# Patient Record
Sex: MALE | Race: WHITE | NOT HISPANIC OR LATINO | Employment: FULL TIME | URBAN - METROPOLITAN AREA
[De-identification: names, ages, dates, MRNs, and addresses within clinical notes are randomized per-mention and may not be internally consistent; named-entity substitution may affect disease eponyms.]

---

## 2022-03-17 ENCOUNTER — TELEPHONE (OUTPATIENT)
Dept: SCHEDULING | Facility: IMAGING CENTER | Age: 22
End: 2022-03-17

## 2022-03-21 ENCOUNTER — OFFICE VISIT (OUTPATIENT)
Dept: MEDICAL GROUP | Facility: LAB | Age: 22
End: 2022-03-21
Payer: COMMERCIAL

## 2022-03-21 VITALS
WEIGHT: 264 LBS | SYSTOLIC BLOOD PRESSURE: 118 MMHG | DIASTOLIC BLOOD PRESSURE: 76 MMHG | HEIGHT: 70 IN | TEMPERATURE: 97.5 F | RESPIRATION RATE: 16 BRPM | HEART RATE: 68 BPM | OXYGEN SATURATION: 97 % | BODY MASS INDEX: 37.8 KG/M2

## 2022-03-21 DIAGNOSIS — Z13.29 THYROID DISORDER SCREENING: ICD-10-CM

## 2022-03-21 DIAGNOSIS — Z76.89 ENCOUNTER TO ESTABLISH CARE: ICD-10-CM

## 2022-03-21 DIAGNOSIS — G89.29 CHRONIC NECK PAIN: ICD-10-CM

## 2022-03-21 DIAGNOSIS — Z13.220 LIPID SCREENING: ICD-10-CM

## 2022-03-21 DIAGNOSIS — F41.9 ANXIETY: ICD-10-CM

## 2022-03-21 DIAGNOSIS — M54.2 CHRONIC NECK PAIN: ICD-10-CM

## 2022-03-21 PROCEDURE — 99385 PREV VISIT NEW AGE 18-39: CPT | Performed by: PHYSICIAN ASSISTANT

## 2022-03-21 RX ORDER — GABAPENTIN 600 MG/1
TABLET ORAL
COMMUNITY
Start: 2022-01-10 | End: 2022-06-21

## 2022-03-21 NOTE — LETTER
[employer] [facility] [place]  Briseida Ronquillo P.A.-C.  [address] [address]  [place] [address]  Fax: [phone]   Authorization for Release/Disclosure of   Protected Health Information   Name: SANDI HAYNES : 2000 SSN: [id number]   Address: [address] [place] [address]  [place] [address] Phone:    [phone] (home)    I authorize the entity listed below to release/disclose the PHI below to:   [facility]/Briseida Ronquillo P.A.-C. and Briseida Ronquillo P.A.-C.   Provider or Entity Name:     Address   City, State, Zip   Phone:      Fax:     Reason for request: continuity of care   Information to be released:    [  ] LAST COLONOSCOPY,  including any PATH REPORT and follow-up  [  ] LAST FIT/COLOGUARD RESULT [  ] LAST DEXA  [  ] LAST MAMMOGRAM  [  ] LAST PAP  [  ] LAST LABS [  ] RETINA EXAM REPORT  [  ] IMMUNIZATION RECORDS  [  ] Release all info      [  ] Check here and initial the line next to each item to release ALL health information INCLUDING  _____ Care and treatment for drug and / or alcohol abuse  _____ HIV testing, infection status, or AIDS  _____ Genetic Testing    DATES OF SERVICE OR TIME PERIOD TO BE DISCLOSED: _____________  I understand and acknowledge that:  * This Authorization may be revoked at any time by you in writing, except if your health information has already been used or disclosed.  * Your health information that will be used or disclosed as a result of you signing this authorization could be re-disclosed by the recipient. If this occurs, your re-disclosed health information may no longer be protected by State or Federal laws.  * You may refuse to sign this Authorization. Your refusal will not affect your ability to obtain treatment.  * This Authorization becomes effective upon signing and will  on (date) __________.      If no date is indicated, this Authorization will  one (1) year from the signature date.    Name: Sandi Haynes    Signature:   Date:     3/21/2022       PLEASE  FAX REQUESTED RECORDS BACK TO: [phone]

## 2022-03-21 NOTE — PROGRESS NOTES
Subjective:     CC:  Diagnoses of Encounter to establish care, Lipid screening, Thyroid disorder screening, Chronic neck pain, Anxiety, and BMI 37.0-37.9, adult were pertinent to this visit.    HISTORY OF THE PRESENT ILLNESS: Patient is a 21 y.o. male. This pleasant patient is here today to establish care and discuss. His/her prior PCP was Dr Pool.    Hx of chronic pain  Patient reports a chronic history of neck pain for the past 3 years.  He has been evaluated by pain management, neuro, neuro surgery and had several imaging tests including MRI done.  Questional diagnosis of osteoarthritis.  At this time patient symptoms are controlled with gabapentin 600 mg 4 times daily.  Patient is elected to defer injections with pain management at this time, he is following with physical therapy.  Patient does not need medication refills today.    Anxiety  Patient was previously described Zoloft 50 mg 1 tablet daily for anxiety symptoms.  He does not take this medication regularly although he does note when he does take it that his symptoms are fairly well controlled.  Denies medication side effects.  Does not require medication refills today        Health Maintenance     - Dyslipidemia (30-45): Ordered  - Diabetes (HTN, HLD, BMI >25): Ordered  - Dental: none  - Eye: UTD  Diet: try to be conscious, likes fatty foods  Exercise: physical job  Substance Use:  Denies  Tobacco Use/counseling: Denies  Safe in relationship: Yes       Infectious disease screening/Immunizations    --Immunizations:               Influenza:  We will request records   Covid-19: We will request records              Tetanus: We will request records              HPV: We will request records    Current Outpatient Medications Ordered in Epic   Medication Sig Dispense Refill   • gabapentin (NEURONTIN) 600 MG tablet TAKE 1 TABLET BY MOUTH UP TO 4 TIMES DAILY     • sertraline (ZOLOFT) 50 MG Tab Take 50 mg by mouth every day.       No current Epic-ordered  "facility-administered medications on file.       ROS:   Gen: no fevers/chills, no changes in weight  Eyes: no changes in vision  ENT: no sore throat, no hearing loss, no bloody nose  Pulm: no sob, no cough  CV: no chest pain, no palpitations  GI: no nausea/vomiting, no diarrhea  : no dysuria  MSk: no myalgias  Skin: no rash  Neuro: no headaches, no numbness/tingling  Heme/Lymph: no easy bruising      Objective:       Exam: /76   Pulse 68   Temp 36.4 °C (97.5 °F)   Resp 16   Ht 1.778 m (5' 10\")   Wt 120 kg (264 lb)   SpO2 97%  Body mass index is 37.88 kg/m².    General: Normal appearing. No distress.  HEENT: Normocephalic. Eyes conjunctiva clear lids without ptosis, pupils equal and reactive to light accommodation, ears normal shape and contour, canals are clear bilaterally, tympanic membranes are benign, nasal mucosa benign, oropharynx is without erythema, edema or exudates.   Neck: Supple without JVD or bruit. Thyroid is not enlarged.  Pulmonary: Clear to ausculation.  Normal effort. No rales, ronchi, or wheezing.  Cardiovascular: Regular rate and rhythm without murmur. Carotid and radial pulses are intact and equal bilaterally.  Abdomen: Soft, nontender, nondistended. Normal bowel sounds. Liver and spleen are not palpable  Neurologic: Grossly nonfocal  Lymph: No cervical or supraclavicular lymph nodes are palpable  Skin: Warm and dry.  No obvious lesions.  Musculoskeletal: Normal gait. No extremity cyanosis, clubbing, or edema.  Psych: Normal mood and affect. Alert and oriented x3. Judgment and insight is normal.      Assessment & Plan:   21 y.o. male with the following -    1. Encounter to establish care  2. Lipid screening  3. Thyroid disorder screening  Labs per orders  We will request records from previous provider for vaccines  Patient is never due for any preventive screening procedures at this time    4. Chronic neck pain  Chronic condition, stable, new to me  Continue follow-up with " specialists, including pain management and physical therapy  Continue gabapentin 600 mg 4 times daily as needed    5. Anxiety  Chronic condition, stable  Continue Zoloft 50 mg 1 tablet daily    6. BMI 37.0-37.9, adult  Patient's weight was discussed today, including healthy low-carb diet, 30-minutes of moderate exercise daily and avoiding sugars and high-fat foods.          I spent a total of 15 minutes with record review, exam, communication with the patient, communication with other providers, and documentation of this encounter.    Return in about 4 weeks (around 4/18/2022).    Please note that this dictation was created using voice recognition software. I have made every reasonable attempt to correct obvious errors, but I expect that there are errors of grammar and possibly content that I did not discover before finalizing the note.

## 2022-04-18 ENCOUNTER — HOSPITAL ENCOUNTER (OUTPATIENT)
Dept: LAB | Facility: MEDICAL CENTER | Age: 22
End: 2022-04-18
Attending: PHYSICIAN ASSISTANT
Payer: COMMERCIAL

## 2022-04-18 DIAGNOSIS — Z13.220 LIPID SCREENING: ICD-10-CM

## 2022-04-18 DIAGNOSIS — Z13.29 THYROID DISORDER SCREENING: ICD-10-CM

## 2022-04-18 LAB
ALBUMIN SERPL BCP-MCNC: 4.8 G/DL (ref 3.2–4.9)
ALBUMIN/GLOB SERPL: 1.8 G/DL
ALP SERPL-CCNC: 69 U/L (ref 30–99)
ALT SERPL-CCNC: 32 U/L (ref 2–50)
ANION GAP SERPL CALC-SCNC: 13 MMOL/L (ref 7–16)
AST SERPL-CCNC: 21 U/L (ref 12–45)
BASOPHILS # BLD AUTO: 0.8 % (ref 0–1.8)
BASOPHILS # BLD: 0.05 K/UL (ref 0–0.12)
BILIRUB SERPL-MCNC: 0.4 MG/DL (ref 0.1–1.5)
BUN SERPL-MCNC: 15 MG/DL (ref 8–22)
CALCIUM SERPL-MCNC: 9.1 MG/DL (ref 8.5–10.5)
CHLORIDE SERPL-SCNC: 105 MMOL/L (ref 96–112)
CHOLEST SERPL-MCNC: 146 MG/DL (ref 100–199)
CO2 SERPL-SCNC: 21 MMOL/L (ref 20–33)
CREAT SERPL-MCNC: 0.69 MG/DL (ref 0.5–1.4)
EOSINOPHIL # BLD AUTO: 0.14 K/UL (ref 0–0.51)
EOSINOPHIL NFR BLD: 2.1 % (ref 0–6.9)
ERYTHROCYTE [DISTWIDTH] IN BLOOD BY AUTOMATED COUNT: 39.9 FL (ref 35.9–50)
EST. AVERAGE GLUCOSE BLD GHB EST-MCNC: 105 MG/DL
FASTING STATUS PATIENT QL REPORTED: NORMAL
GFR SERPLBLD CREATININE-BSD FMLA CKD-EPI: 134 ML/MIN/1.73 M 2
GLOBULIN SER CALC-MCNC: 2.7 G/DL (ref 1.9–3.5)
GLUCOSE SERPL-MCNC: 92 MG/DL (ref 65–99)
HBA1C MFR BLD: 5.3 % (ref 4–5.6)
HCT VFR BLD AUTO: 46.5 % (ref 42–52)
HDLC SERPL-MCNC: 50 MG/DL
HGB BLD-MCNC: 14.9 G/DL (ref 14–18)
IMM GRANULOCYTES # BLD AUTO: 0.04 K/UL (ref 0–0.11)
IMM GRANULOCYTES NFR BLD AUTO: 0.6 % (ref 0–0.9)
LDLC SERPL CALC-MCNC: 87 MG/DL
LYMPHOCYTES # BLD AUTO: 1.83 K/UL (ref 1–4.8)
LYMPHOCYTES NFR BLD: 27.8 % (ref 22–41)
MCH RBC QN AUTO: 26.1 PG (ref 27–33)
MCHC RBC AUTO-ENTMCNC: 32 G/DL (ref 33.7–35.3)
MCV RBC AUTO: 81.4 FL (ref 81.4–97.8)
MONOCYTES # BLD AUTO: 0.52 K/UL (ref 0–0.85)
MONOCYTES NFR BLD AUTO: 7.9 % (ref 0–13.4)
NEUTROPHILS # BLD AUTO: 4 K/UL (ref 1.82–7.42)
NEUTROPHILS NFR BLD: 60.8 % (ref 44–72)
NRBC # BLD AUTO: 0 K/UL
NRBC BLD-RTO: 0 /100 WBC
PLATELET # BLD AUTO: 324 K/UL (ref 164–446)
PMV BLD AUTO: 9.7 FL (ref 9–12.9)
POTASSIUM SERPL-SCNC: 4.3 MMOL/L (ref 3.6–5.5)
PROT SERPL-MCNC: 7.5 G/DL (ref 6–8.2)
RBC # BLD AUTO: 5.71 M/UL (ref 4.7–6.1)
SODIUM SERPL-SCNC: 139 MMOL/L (ref 135–145)
TRIGL SERPL-MCNC: 47 MG/DL (ref 0–149)
TSH SERPL DL<=0.005 MIU/L-ACNC: 1.44 UIU/ML (ref 0.38–5.33)
WBC # BLD AUTO: 6.6 K/UL (ref 4.8–10.8)

## 2022-04-18 PROCEDURE — 84443 ASSAY THYROID STIM HORMONE: CPT

## 2022-04-18 PROCEDURE — 83036 HEMOGLOBIN GLYCOSYLATED A1C: CPT

## 2022-04-18 PROCEDURE — 80061 LIPID PANEL: CPT

## 2022-04-18 PROCEDURE — 36415 COLL VENOUS BLD VENIPUNCTURE: CPT

## 2022-04-18 PROCEDURE — 85025 COMPLETE CBC W/AUTO DIFF WBC: CPT

## 2022-04-18 PROCEDURE — 80053 COMPREHEN METABOLIC PANEL: CPT

## 2022-06-21 ENCOUNTER — OFFICE VISIT (OUTPATIENT)
Dept: MEDICAL GROUP | Facility: LAB | Age: 22
End: 2022-06-21
Payer: COMMERCIAL

## 2022-06-21 VITALS
HEART RATE: 60 BPM | HEIGHT: 70 IN | BODY MASS INDEX: 35.65 KG/M2 | DIASTOLIC BLOOD PRESSURE: 78 MMHG | WEIGHT: 249 LBS | TEMPERATURE: 97.8 F | SYSTOLIC BLOOD PRESSURE: 120 MMHG | RESPIRATION RATE: 16 BRPM | OXYGEN SATURATION: 97 %

## 2022-06-21 PROCEDURE — 99212 OFFICE O/P EST SF 10 MIN: CPT | Performed by: PHYSICIAN ASSISTANT

## 2022-06-21 ASSESSMENT — FIBROSIS 4 INDEX: FIB4 SCORE: 0.25

## 2022-06-21 NOTE — PROGRESS NOTES
"Subjective:     CC: f/u labs    HPI:   Artie here today with    Pt reports that his dad was dx with HTN, Pt's BP has been well controlled.    Trying to lose weight to join [employer], needs to lose additional 30 lbs, will plan to join in Nov. Has lost 15lbs since last appt.    Stopped taking gabapentin and zoloft as [employer] does not allow prescription medications. Pt states that his symptoms continue to be well controlled without medications.     ROS:  Gen: no fevers/chills, no changes in weight  Eyes: no changes in vision  ENT: no sore throat, no hearing loss, no bloody nose  Pulm: no sob, no cough  CV: no chest pain, no palpitations  GI: no nausea/vomiting, no diarrhea  : no dysuria  MSk: no myalgias  Skin: no rash  Neuro: no headaches, no numbness/tingling  Heme/Lymph: no easy bruising    No current Epic-ordered outpatient medications on file.     No current Epic-ordered facility-administered medications on file.         Objective:     Exam:  /78   Pulse 60   Temp 36.6 °C (97.8 °F)   Resp 16   Ht 1.778 m (5' 10\")   Wt 113 kg (249 lb)   SpO2 97%   BMI 35.73 kg/m²  Body mass index is 35.73 kg/m².    Gen: Alert and oriented, No apparent distress.  Neck: Neck is supple without lymphadenopathy.  Lungs: Normal effort, CTA bilaterally, no wheezes, rhonchi, or rales  CV: Regular rate and rhythm. No murmurs, rubs, or gallops.  Ext: No clubbing, cyanosis, edema.      Assessment & Plan:     21 y.o. male with the following -     1. BMI 35.0-35.9,adult  Patient's weight was discussed today, including healthy low-carb diet, 30-minutes of moderate exercise daily and avoiding sugars and high-fat foods.          I spent a total of 12 minutes with record review, exam, communication with the patient, communication with other providers, and documentation of this encounter.      Return in about 1 year (around 6/21/2023).    Please note that this dictation was created using voice recognition software. I have made every " reasonable attempt to correct obvious errors, but there may be errors of grammar and possibly content that I did not discover before finalizing the note.

## 2024-11-15 ENCOUNTER — OFFICE VISIT (OUTPATIENT)
Dept: MEDICAL GROUP | Facility: LAB | Age: 24
End: 2024-11-15
Payer: COMMERCIAL

## 2024-11-15 VITALS
TEMPERATURE: 96.7 F | HEART RATE: 72 BPM | OXYGEN SATURATION: 96 % | WEIGHT: 275.4 LBS | RESPIRATION RATE: 14 BRPM | HEIGHT: 71 IN | DIASTOLIC BLOOD PRESSURE: 72 MMHG | SYSTOLIC BLOOD PRESSURE: 116 MMHG | BODY MASS INDEX: 38.56 KG/M2

## 2024-11-15 DIAGNOSIS — Z13.29 SCREENING FOR THYROID DISORDER: ICD-10-CM

## 2024-11-15 DIAGNOSIS — F41.9 ANXIETY: ICD-10-CM

## 2024-11-15 DIAGNOSIS — Z13.220 LIPID SCREENING: ICD-10-CM

## 2024-11-15 DIAGNOSIS — Z76.89 ENCOUNTER TO ESTABLISH CARE: ICD-10-CM

## 2024-11-15 DIAGNOSIS — Z23 NEED FOR VACCINATION: ICD-10-CM

## 2024-11-15 DIAGNOSIS — E66.9 OBESITY (BMI 30-39.9): ICD-10-CM

## 2024-11-15 PROCEDURE — 90471 IMMUNIZATION ADMIN: CPT | Performed by: PHYSICIAN ASSISTANT

## 2024-11-15 PROCEDURE — 3074F SYST BP LT 130 MM HG: CPT | Performed by: PHYSICIAN ASSISTANT

## 2024-11-15 PROCEDURE — 99214 OFFICE O/P EST MOD 30 MIN: CPT | Mod: 25 | Performed by: PHYSICIAN ASSISTANT

## 2024-11-15 PROCEDURE — 90656 IIV3 VACC NO PRSV 0.5 ML IM: CPT | Performed by: PHYSICIAN ASSISTANT

## 2024-11-15 PROCEDURE — 3078F DIAST BP <80 MM HG: CPT | Performed by: PHYSICIAN ASSISTANT

## 2024-11-15 RX ORDER — PROPRANOLOL HYDROCHLORIDE 10 MG/1
10 TABLET ORAL 3 TIMES DAILY
Qty: 90 TABLET | Refills: 0 | Status: SHIPPED | OUTPATIENT
Start: 2024-11-15

## 2024-11-15 RX ORDER — SERTRALINE HYDROCHLORIDE 25 MG/1
25 TABLET, FILM COATED ORAL DAILY
Qty: 30 TABLET | Refills: 1 | Status: SHIPPED | OUTPATIENT
Start: 2024-11-15

## 2024-11-15 ASSESSMENT — ANXIETY QUESTIONNAIRES
6. BECOMING EASILY ANNOYED OR IRRITABLE: SEVERAL DAYS
5. BEING SO RESTLESS THAT IT IS HARD TO SIT STILL: NEARLY EVERY DAY
GAD7 TOTAL SCORE: 15
3. WORRYING TOO MUCH ABOUT DIFFERENT THINGS: NEARLY EVERY DAY
IF YOU CHECKED OFF ANY PROBLEMS ON THIS QUESTIONNAIRE, HOW DIFFICULT HAVE THESE PROBLEMS MADE IT FOR YOU TO DO YOUR WORK, TAKE CARE OF THINGS AT HOME, OR GET ALONG WITH OTHER PEOPLE: VERY DIFFICULT
7. FEELING AFRAID AS IF SOMETHING AWFUL MIGHT HAPPEN: MORE THAN HALF THE DAYS
4. TROUBLE RELAXING: MORE THAN HALF THE DAYS
1. FEELING NERVOUS, ANXIOUS, OR ON EDGE: NEARLY EVERY DAY
2. NOT BEING ABLE TO STOP OR CONTROL WORRYING: SEVERAL DAYS

## 2024-11-15 ASSESSMENT — PATIENT HEALTH QUESTIONNAIRE - PHQ9
CLINICAL INTERPRETATION OF PHQ2 SCORE: 4
5. POOR APPETITE OR OVEREATING: 2 - MORE THAN HALF THE DAYS
SUM OF ALL RESPONSES TO PHQ QUESTIONS 1-9: 19

## 2024-11-15 NOTE — PROGRESS NOTES
Subjective:     CC: Anxiety, obesity    HPI:   Artie here today with   Verbal consent was acquired by the patient to use [facility] [employer] ambient listening note generation during this visit Yes     History of Present Illness  The patient presents for evaluation of anxiety, depression, and weight management.    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 Upon returning to the , he discontinued his medication in an attempt to join the [employer]. He eventually secured a well-paying job and would like to resume his medication. His depression has improved, but he continues to struggle with anxiety, particularly when making decisions or feeling overwhelmed. He often finds it difficult to focus and frequently zones out, even during activities he enjoys. He experiences social anxiety, particularly when speaking in front of others, and often feels overwhelmed when juggling multiple tasks. He tends to anticipate the worst-case scenario in situations, such as fearing his 1-year-old son will fall and require an ambulance. During his mission, he sought psychiatric help and was prescribed sertraline, which significantly improved his condition. He has experienced panic attacks, characterized by a sense of dread and chest tightness, multiple times a week.     He is concerned about his weight, especially after a close friend was diagnosed with cancer. Despite not exercising regularly, he has been monitoring his diet for the past 8 months, avoiding sugary sodas and limiting his daily caloric intake to less than 2000 calories. However, he has not seen any significant changes in his weight. He is considering consulting a nutritionist and is interested in having lab work done to guide his next steps.            ROS:  Gen: no fevers/chills, no changes in weight  Eyes: no changes in vision  ENT: no sore throat, no hearing loss, no bloody nose  Pulm: no sob, no cough  CV: no chest pain,  "no palpitations  GI: no nausea/vomiting, no diarrhea  : no dysuria  MSk: no myalgias  Skin: no rash  Neuro: no headaches, no numbness/tingling  Heme/Lymph: no easy bruising    No current [facility]-ordered outpatient medications on file.     No current [facility]-ordered facility-administered medications on file.       Health Maintenance: Completed    Objective:     Exam:  /72 (BP Location: Right arm, Patient Position: Sitting, BP Cuff Size: Large adult)   Pulse 72   Temp 35.9 °C (96.7 °F)   Resp 14   Ht 1.803 m (5' 11\")   Wt 125 kg (275 lb 6.4 oz)   SpO2 96%   BMI 38.41 kg/m²  Body mass index is 38.41 kg/m².    Gen: Alert and oriented, No apparent distress.  Neck: Neck is supple without lymphadenopathy.  Lungs: Normal effort, CTA bilaterally, no wheezes, rhonchi, or rales  CV: Regular rate and rhythm. No murmurs, rubs, or gallops.  Ext: No clubbing, cyanosis, edema.      Assessment & Plan:     24 y.o. male with the following -     Assessment & Plan  1. Anxiety.  He reports experiencing frequent anxiety, particularly when making decisions or feeling overwhelmed. He also describes symptoms consistent with panic attacks, occurring multiple times a week. He was previously on sertraline (Zoloft) and found it helpful without noticeable side effects. He will be started on Zoloft 25 mg once a day for at least 2 weeks to assess effectiveness. Propranolol 10 mg up to three times a day as needed has been prescribed to manage acute anxiety symptoms. Counseling or therapy was discussed but he opted to start with medication alone.    2. Depression.  He reports a history of depression, which has improved but still persists. The treatment plan includes the use of Zoloft 25 mg once daily, which he had previously found beneficial.    3. Weight management.  He has been monitoring his diet, aiming for less than 2000 calories a day, and avoiding sugary sodas. Despite these efforts, he has not seen significant changes in weight over " the past 8 months. He has a sedentary job and exercises infrequently. He has been advised to measure and weigh his food to ensure accurate portion sizes and to increase physical activity to boost metabolism and muscle mass. Fasting blood work has been ordered to assess red and white blood cell counts, kidney function, liver function, electrolytes, cholesterol, thyroid, and blood sugar levels. Medication options for weight loss were discussed but will be considered after reviewing lab results.    4. Health Maintenance.  An influenza vaccine was administered today.    Follow-up  Return in 1 month for follow up.    1. Encounter to establish care        2. Anxiety  Patient has been identified as having a positive depression screening. Appropriate orders and counseling have been given.    sertraline (ZOLOFT) 25 MG tablet    propranolol (INDERAL) 10 MG Tab      3. Obesity (BMI 30-39.9)  Lipid Profile    TSH WITH REFLEX TO FT4    HEMOGLOBIN A1C      4. Lipid screening  CBC WITH DIFFERENTIAL    Comp Metabolic Panel    Lipid Profile      5. Screening for thyroid disorder  TSH WITH REFLEX TO FT4      6. Need for vaccination  INFLUENZA VACCINE TRI INJ (PF)            I spent a total of 20 minutes with record review, exam, communication with the patient, communication with other providers, and documentation of this encounter.      No follow-ups on file.    Please note that this dictation was created using voice recognition software. I have made every reasonable attempt to correct obvious errors, but there may be errors of grammar and possibly content that I did not discover before finalizing the note.

## 2024-12-07 ENCOUNTER — PATIENT MESSAGE (OUTPATIENT)
Dept: MEDICAL GROUP | Facility: LAB | Age: 24
End: 2024-12-07
Payer: COMMERCIAL

## 2024-12-07 DIAGNOSIS — F41.9 ANXIETY: ICD-10-CM

## 2025-01-15 DIAGNOSIS — F41.9 ANXIETY: ICD-10-CM

## 2025-01-15 NOTE — TELEPHONE ENCOUNTER
Received request via: Pharmacy    Was the patient seen in the last year in this department? Yes    Does the patient have an active prescription (recently filled or refills available) for medication(s) requested? No    Pharmacy Name: [facility]    Does the patient have [facility] and need 100-day supply? (This applies to ALL medications) Patient does not have SCP

## 2025-04-01 DIAGNOSIS — F41.9 ANXIETY: ICD-10-CM

## 2025-04-01 NOTE — TELEPHONE ENCOUNTER
Received request via: Pharmacy    Was the patient seen in the last year in this department? Yes    Does the patient have an active prescription (recently filled or refills available) for medication(s) requested? No    Pharmacy Name: [facility]    Does the patient have [facility] Plus and need 100-day supply? (This applies to ALL medications) Patient does not have SCP